# Patient Record
Sex: FEMALE | Race: WHITE
[De-identification: names, ages, dates, MRNs, and addresses within clinical notes are randomized per-mention and may not be internally consistent; named-entity substitution may affect disease eponyms.]

---

## 2017-07-22 ENCOUNTER — HEALTH MAINTENANCE LETTER (OUTPATIENT)
Age: 42
End: 2017-07-22

## 2019-02-27 ENCOUNTER — OFFICE VISIT - RIVER FALLS (OUTPATIENT)
Dept: FAMILY MEDICINE | Facility: CLINIC | Age: 44
End: 2019-02-27

## 2019-02-27 ASSESSMENT — MIFFLIN-ST. JEOR: SCORE: 1370.39

## 2019-11-03 ENCOUNTER — HEALTH MAINTENANCE LETTER (OUTPATIENT)
Age: 44
End: 2019-11-03

## 2020-11-16 ENCOUNTER — HEALTH MAINTENANCE LETTER (OUTPATIENT)
Age: 45
End: 2020-11-16

## 2021-09-18 ENCOUNTER — HEALTH MAINTENANCE LETTER (OUTPATIENT)
Age: 46
End: 2021-09-18

## 2022-01-08 ENCOUNTER — HEALTH MAINTENANCE LETTER (OUTPATIENT)
Age: 47
End: 2022-01-08

## 2022-02-12 VITALS
HEART RATE: 76 BPM | DIASTOLIC BLOOD PRESSURE: 72 MMHG | BODY MASS INDEX: 27.83 KG/M2 | SYSTOLIC BLOOD PRESSURE: 124 MMHG | HEIGHT: 64 IN | WEIGHT: 163 LBS

## 2022-02-15 NOTE — PROGRESS NOTES
Patient:   FIFI RUIZ            MRN: [id number]            FIN: [id number]               Age:   43 years     Sex:  Female     :  1975   Associated Diagnoses:   Pre-employment examination   Author:   Randy Wooten PA-C      Report Summary   Diagnosis  Pre-employment examination ([id number] Z02.1).  Patient Instructions   Visit Information      Date of Service: 2019 01:54 pm  Performing Location: [facility]  Encounter#: [id number]      Primary Care Provider (PCP):  NONE ,       Referring Provider:  Randy Wooten PA-C    NPI# [id number]   Visit type:  Pre-employment exam   .    Accompanied by:  No one.    Source of history:  Self.    Referral source:  Self.    History limitation:  None.       Chief Complaint   2019 2:04 PM CST    Pre-employment exam,tb questionarie and back screen        Well Adult History   Well Adult History   The patient's general health status is described as good.  Exercise: routine.  Additional pertinent history: tobacco use none and no alcohol use.        Review of Systems   Constitutional:  Negative.    Eye:  Negative.    Respiratory:  Negative.    Cardiovascular:  Negative.    Breast:  Negative.    Gastrointestinal:  Negative.    Genitourinary:  Negative.    Gynecologic:  Negative.    Hematology/Lymphatics:  Negative.    Endocrine:  Negative.    Immunologic:  Negative.    Musculoskeletal:  Negative.    Integumentary:  Negative.    Neurologic:  Negative.    Psychiatric:  Negative.       Health Status   Allergies:    Allergic Reactions (All)  No Known Medication Allergies   Medications:  (Selected)   Documented Medications  Documented  birth control: birth control, Supply, 0 Refill(s), Type: Maintenance  propranolol: 0 Refill(s), Type: Maintenance   Problem list:    No problem items selected or recorded.      Histories   Past Medical History:    No active or resolved past medical history items have been selected or recorded.   Family History:    No family  history items have been selected or recorded.   Procedure history:    No active procedure history items have been selected or recorded.   Social History:             No active social history items have been recorded.      Physical Examination   Vital Signs   2/27/2019 2:04 PM CST Peripheral Pulse Rate 76 bpm    Pulse Site Radial artery    HR Method Manual    Systolic Blood Pressure 124 mmHg    Diastolic Blood Pressure 72 mmHg    Mean Arterial Pressure 89 mmHg    BP Site Right arm    BP Method Manual      Measurements from flowsheet : Measurements   2/27/2019 2:04 PM CST Height Measured - Standard 63.75 in    Weight Measured - Standard 163.0 lb    BSA 1.82 m2    Body Mass Index 28.2 kg/m2  HI      General:  Alert and oriented, No acute distress.    Eye:  Pupils are equal, round and reactive to light, Extraocular movements are intact, Normal conjunctiva.    HENT:  Normocephalic, Tympanic membranes are clear, Normal hearing, Oral mucosa is moist, No pharyngeal erythema.    Neck:  Supple, Non-tender, No lymphadenopathy, No thyromegaly.    Respiratory:  Lungs are clear to auscultation, Respirations are non-labored, Breath sounds are equal.    Cardiovascular:  Normal rate, Regular rhythm, No murmur.    Gastrointestinal:  Soft, Non-tender, Non-distended, No organomegaly.    Genitourinary:  No costovertebral angle tenderness.    Musculoskeletal:  Normal range of motion, Normal strength, No tenderness, No swelling, Score of 7 on low back screen..    Integumentary:  Warm, Pink, No rash.    Neurologic:  Alert, Oriented, Normal sensory, Normal motor function, No focal deficits.    Psychiatric:  Cooperative, Appropriate mood & affect.       Impression and Plan   Diagnosis     Pre-employment examination ([id number] Z02.1).     Patient Instructions:       Counseled: Patient, Verbalized understanding.    See form in chart. Cleared without restriction.

## 2022-02-15 NOTE — NURSING NOTE
Comprehensive Intake Entered On:  2/27/2019 2:08 PM CST    Performed On:  2/27/2019 2:04 PM CST by Bhumika Barger CMA               Summary   Chief Complaint :   Pre-employment exam,tb questionarie and back screen   Weight Measured :   163.0 lb(Converted to: 163 lb 0 oz, 73.94 kg)    Height Measured :   63.75 in(Converted to: 5 ft 4 in, 161.92 cm)    Body Mass Index :   28.2 kg/m2 (HI)    Body Surface Area :   1.82 m2   Systolic Blood Pressure :   124 mmHg   Diastolic Blood Pressure :   72 mmHg   Mean Arterial Pressure :   89 mmHg   Peripheral Pulse Rate :   76 bpm   BP Site :   Right arm   Pulse Site :   Radial artery   BP Method :   Manual   HR Method :   Manual   Bhumika Barger CMA - 2/27/2019 2:04 PM CST   Health Status   Allergies Verified? :   Yes   Medication History Verified? :   Yes   Medical History Verified? :   Yes   Pre-Visit Planning Status :   N/A   Tobacco Use? :   Never smoker   Bhumika Barger CMA - 2/27/2019 2:04 PM CST   Consents   Consent for Immunization Exchange :   Consent Granted   Consent for Immunizations to Providers :   Consent Granted   Bhumika Barger CMA - 2/27/2019 2:04 PM CST   Meds / Allergies   (As Of: 2/27/2019 2:08:59 PM CST)   Allergies (Active)   No Known Medication Allergies  Estimated Onset Date:   Unspecified ; Created By:   Bhumika Baregr CMA; Reaction Status:   Active ; Category:   Drug ; Substance:   No Known Medication Allergies ; Type:   Allergy ; Updated By:   Bhumika Barger CMA; Reviewed Date:   2/27/2019 2:07 PM CST        Medication List   (As Of: 2/27/2019 2:08:59 PM CST)   Home Meds    Miscellaneous Prescription  :   Miscellaneous Prescription ; Status:   Documented ; Ordered As Mnemonic:   birth control ; Simple Display Line:   0 Refill(s) ; Catalog Code:   Miscellaneous Prescription ; Order Dt/Tm:   2/27/2019 2:07:42 PM          propranolol  :   propranolol ; Status:   Documented ; Ordered As Mnemonic:   propranolol ; Simple Display Line:   0 Refill(s) ; Catalog Code:    propranolol ; Order Dt/Tm:   2/27/2019 2:07:37 PM            Vision Testing POC   Corrective Lenses :   Glasses   Eye, Left with Correction Visual Acuity :   20/40   Eye, Right with Correction Visual Acuity :   20/20   Eye, Bilat w/Correction Visual Acuity :   20/15   Bhumika Barger CMA - 2/27/2019 2:38 PM CST

## 2022-08-20 ENCOUNTER — HEALTH MAINTENANCE LETTER (OUTPATIENT)
Age: 47
End: 2022-08-20

## 2022-11-20 ENCOUNTER — HEALTH MAINTENANCE LETTER (OUTPATIENT)
Age: 47
End: 2022-11-20

## 2023-04-15 ENCOUNTER — HEALTH MAINTENANCE LETTER (OUTPATIENT)
Age: 48
End: 2023-04-15

## 2023-09-10 ENCOUNTER — HEALTH MAINTENANCE LETTER (OUTPATIENT)
Age: 48
End: 2023-09-10